# Patient Record
Sex: FEMALE | Race: WHITE | NOT HISPANIC OR LATINO | Employment: FULL TIME | RURAL
[De-identification: names, ages, dates, MRNs, and addresses within clinical notes are randomized per-mention and may not be internally consistent; named-entity substitution may affect disease eponyms.]

---

## 2017-07-27 ENCOUNTER — TRANSCRIBE ORDERS (OUTPATIENT)
Dept: PHYSICAL THERAPY | Facility: HOSPITAL | Age: 32
End: 2017-07-27

## 2017-07-27 DIAGNOSIS — S80.02XA CONTUSION OF LEFT KNEE, INITIAL ENCOUNTER: Primary | ICD-10-CM

## 2017-08-09 ENCOUNTER — APPOINTMENT (OUTPATIENT)
Dept: PHYSICAL THERAPY | Facility: HOSPITAL | Age: 32
End: 2017-08-09

## 2017-08-14 ENCOUNTER — HOSPITAL ENCOUNTER (OUTPATIENT)
Dept: PHYSICAL THERAPY | Facility: HOSPITAL | Age: 32
Setting detail: THERAPIES SERIES
Discharge: HOME | End: 2017-08-14

## 2017-08-14 DIAGNOSIS — M25.551 BILATERAL HIP PAIN: ICD-10-CM

## 2017-08-14 DIAGNOSIS — S80.02XA CONTUSION OF LEFT KNEE, INITIAL ENCOUNTER: Primary | ICD-10-CM

## 2017-08-14 DIAGNOSIS — M25.552 BILATERAL HIP PAIN: ICD-10-CM

## 2017-08-14 PROCEDURE — 97161 PT EVAL LOW COMPLEX 20 MIN: CPT
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
## 2017-08-24 ENCOUNTER — HOSPITAL ENCOUNTER (OUTPATIENT)
Dept: PHYSICAL THERAPY | Facility: HOSPITAL | Age: 32
Setting detail: THERAPIES SERIES
Discharge: HOME | End: 2017-08-24

## 2017-08-24 DIAGNOSIS — M25.552 BILATERAL HIP PAIN: ICD-10-CM

## 2017-08-24 DIAGNOSIS — S80.02XA CONTUSION OF LEFT KNEE, INITIAL ENCOUNTER: Primary | ICD-10-CM

## 2017-08-24 DIAGNOSIS — M25.551 BILATERAL HIP PAIN: ICD-10-CM

## 2017-08-24 PROCEDURE — 97110 THERAPEUTIC EXERCISES: CPT

## 2017-08-24 NOTE — THERAPY TREATMENT NOTE
Outpatient Physical Therapy Ortho Treatment Note  [facility]  Laura Bennett PTA  17  9:45 AM       Patient Name: Manish Lorenzana  : 1985  MRN: [id number]  Today's Date: 2017      Visit Date: 2017  Subjective Improvement: 0%       Attendance:    Approved: 6 visits          MD follow up: ?          RC date: 17         Visit Dx:    ICD-10-CM ICD-9-CM   1. Contusion of left knee, initial encounter S80.02XA 924.11   2. Bilateral hip pain M25.551 719.45    M25.552        There is no problem list on file for this patient.       Past Medical History:   Diagnosis Date   • ADD (attention deficit disorder)    • Disease of thyroid gland     hypothyroid        Past Surgical History:   Procedure Laterality Date   • BICEPS TENDON REPAIR Right    • EAR CANALOPLASTY     • HYSTERECTOMY      partial   • LAPAROSCOPIC TUBAL LIGATION     • ROTATOR CUFF REPAIR Right    • TONSILLECTOMY               PT Ortho       17 0800    Subjective Comments    Subjective Comments pt reports that today her L knee is bothering her more than anything else.  -LONI    Precautions and Contraindications    Precautions/Limitations no known precautions/limitations  -LONI    Subjective Pain    Able to rate subjective pain? yes  -LONI    Pre-Treatment Pain Level 6   L knee  -      User Key  (r) = Recorded By, (t) = Taken By, (c) = Cosigned By    Initials Name Provider Type    LONI Bennett PTA Physical Therapy Assistant                            PT Assessment/Plan       17 0900       PT Assessment    Functional Limitations Limitations in community activities;Limitations in functional capacity and performance;Performance in leisure activities;Performance in work activities  -     Impairments Range of motion;Posture;Poor body mechanics;Pain;Muscle strength;Joint mobility  -     Assessment Comments pt needed no cues for TA contraction today. pt did have some pain with SLR. Good quad  contraction.  -LONI     Rehab Potential Good  -LONI     Patient/caregiver participated in establishment of treatment plan and goals Yes  -LONI     Patient would benefit from skilled therapy intervention Yes  -LNOI     PT Plan    PT Frequency 2x/week  -LONI     Predicted Duration of Therapy Intervention (days/wks) 4 weeks  -LONI     PT Plan Comments Check alignment. Mini squats next  -LONI       User Key  (r) = Recorded By, (t) = Taken By, (c) = Cosigned By    Initials Name Provider Type    LONI Bennett PTA Physical Therapy Assistant                Modalities       08/24/17 0800          Subjective Pain    Post-Treatment Pain Level 0  -LONI      Ice    Ice Applied --   deferred  -LONI        User Key  (r) = Recorded By, (t) = Taken By, (c) = Cosigned By    Initials Name Provider Type    LONI Bennett PTA Physical Therapy Assistant                Exercises       08/24/17 0800          Subjective Comments    Subjective Comments pt reports that today her L knee is bothering her more than anything else.  -LONI      Subjective Pain    Able to rate subjective pain? yes  -LONI      Pre-Treatment Pain Level 6   L knee  -LONI      Post-Treatment Pain Level 0  -LONI      Aquatics    Aquatics performed? No  -LONI      Exercise 1    Exercise Name 1 PRO II for ROM  -LONI      Time (Minutes) 1 8 min  -LONI      Additional Comments L2.0  -LONI      Exercise 2    Exercise Name 2 HS S   -LONI      Reps 2 3  -LONI      Time (Seconds) 2 30 sec hold  -LONI      Exercise 3    Exercise Name 3 Incline S  -LONI      Reps 3 3  -LONI      Time (Seconds) 3 30 sec hold  -LONI      Exercise 4    Exercise Name 4 B hip flexor S  -LONI      Reps 4 3  -LONI      Time (Seconds) 4 30 sec each  -LONI      Exercise 5    Exercise Name 5 CR/TR  -LONI      Sets 5 2  -LONI      Reps 5 10  -LONI      Exercise 6    Exercise Name 6 B QS  -LONI      Sets 6 2  -LONI      Reps 6 10  -LONI      Time (Seconds) 6 5 sec hold  -LONI      Exercise 7    Exercise Name 7 B SLR FF  -LONI      Reps 7 10   each  -LONI       Exercise 8    Exercise Name 8 Bridges w/ TA  -LONI        User Key  (r) = Recorded By, (t) = Taken By, (c) = Cosigned By    Initials Name Provider Type    LONI Bennett PTA Physical Therapy Assistant                               PT OP Goals       08/24/17 0900       PT Short Term Goals    STG Date to Achieve 08/28/17  -LONI     STG 1 IND and compliant with HEP  -LONI     STG 1 Progress Ongoing  -LONI     STG 2 Reduce pain from 6/10 to no higher than 4/10  -LONI     STG 2 Progress Ongoing  -LONI     STG 3 Increase B hip flexor strength to 4-/5   -LONI     STG 3 Progress Ongoing  -LONI     STG 4 Reduce LEFS score 40/80  -LONI     STG 4 Progress Ongoing  -LONI     Long Term Goals    LTG Date to Achieve 09/11/17  -LONI     LTG 1 Patient will be able to tolerate 45 minute treatment session without pain increase over 2/10 to improve tolerance to work tasks.  -LONI     LTG 1 Progress Ongoing  -LONI     LTG 2 Patient will increase B hip flexor strength to 4+/5  -LONI     LTG 2 Progress Ongoing  -LONI     LTG 3 Patient will increase B hip abd strength to 4+/5  -LONI     LTG 3 Progress Ongoing  -LONI     LTG 4 Patient will be able to demo squat and lifting mechanics without verbal cuing.   -LONI     LTG 4 Progress Ongoing  -LONI     LTG 5 Patient will have pain free PROM in B hips  -LONI     LTG 5 Progress Ongoing  -LONI     Time Calculation    PT Goal Re-Cert Due Date 09/04/17   Visits: 2/2 MD: ? Improvement: ?  -LONI       User Key  (r) = Recorded By, (t) = Taken By, (c) = Cosigned By    Initials Name Provider Type    LONI Bennett PTA Physical Therapy Assistant                Therapy Education       08/24/17 0900          Therapy Education    Education Details SLR FF, QS, Oren w/ TA  -LONI      Given HEP;Pain management;Posture/body mechanics  -LONI      Program New  -LONI      How Provided Written;Verbal  -LONI      Provided to Patient  -LONI      Level of Understanding Verbalized;Teach back education performed  -LONI        User Key  (r) = Recorded By,  (t) = Taken By, (c) = Cosigned By    Initials Name Provider Type    LONI Bennett PTA Physical Therapy Assistant                Time Calculation:   Start Time: 0845  Stop Time: 0931  Time Calculation (min): 46 min  Total Timed Code Minutes- PT: 46 minute(s)    Therapy Charges for Today     Code Description Service Date Service Provider Modifiers Qty    74068002156 HC PT THER PROC EA 15 MIN 8/24/2017 Laura Bennett PTA GP 3                    Laura Bennett PTA  8/24/2017

## 2017-08-28 ENCOUNTER — APPOINTMENT (OUTPATIENT)
Dept: PHYSICAL THERAPY | Facility: HOSPITAL | Age: 32
End: 2017-08-28

## 2017-08-30 ENCOUNTER — HOSPITAL ENCOUNTER (OUTPATIENT)
Dept: PHYSICAL THERAPY | Facility: HOSPITAL | Age: 32
Setting detail: THERAPIES SERIES
Discharge: HOME | End: 2017-08-30

## 2017-08-30 DIAGNOSIS — M25.551 BILATERAL HIP PAIN: ICD-10-CM

## 2017-08-30 DIAGNOSIS — S80.02XA CONTUSION OF LEFT KNEE, INITIAL ENCOUNTER: Primary | ICD-10-CM

## 2017-08-30 DIAGNOSIS — M25.552 BILATERAL HIP PAIN: ICD-10-CM

## 2017-08-30 PROCEDURE — 97110 THERAPEUTIC EXERCISES: CPT | Performed by: PHYSICAL THERAPIST

## 2017-09-06 ENCOUNTER — APPOINTMENT (OUTPATIENT)
Dept: PHYSICAL THERAPY | Facility: HOSPITAL | Age: 32
End: 2017-09-06

## 2017-09-08 ENCOUNTER — HOSPITAL ENCOUNTER (OUTPATIENT)
Dept: PHYSICAL THERAPY | Facility: HOSPITAL | Age: 32
Setting detail: THERAPIES SERIES
Discharge: HOME | End: 2017-09-08

## 2017-09-08 DIAGNOSIS — M25.552 BILATERAL HIP PAIN: ICD-10-CM

## 2017-09-08 DIAGNOSIS — M25.551 BILATERAL HIP PAIN: ICD-10-CM

## 2017-09-08 DIAGNOSIS — S80.02XA CONTUSION OF LEFT KNEE, INITIAL ENCOUNTER: Primary | ICD-10-CM

## 2017-09-08 PROCEDURE — 97110 THERAPEUTIC EXERCISES: CPT

## 2017-09-08 NOTE — THERAPY TREATMENT NOTE
Outpatient Physical Therapy Ortho Treatment Note  [facility]  Laura Bennett, PTA  17  11:41 AM       Patient Name: Manish Lorenzana  : 1985  MRN: [id number]  Today's Date: 2017      Visit Date: 2017    Subjective Improvement: ?       Attendance:   Approved: 6           MD follow up: ?          RC date: 17         Visit Dx:    ICD-10-CM ICD-9-CM   1. Contusion of left knee, initial encounter S80.02XA 924.11   2. Bilateral hip pain M25.551 719.45    M25.552        There is no problem list on file for this patient.       Past Medical History:   Diagnosis Date   • ADD (attention deficit disorder)    • Disease of thyroid gland     hypothyroid        Past Surgical History:   Procedure Laterality Date   • BICEPS TENDON REPAIR Right    • EAR CANALOPLASTY     • HYSTERECTOMY      partial   • LAPAROSCOPIC TUBAL LIGATION     • ROTATOR CUFF REPAIR Right    • TONSILLECTOMY                               PT Assessment/Plan       17 1100       PT Assessment    Functional Limitations Limitations in community activities;Limitations in functional capacity and performance;Performance in leisure activities;Performance in work activities  -     Impairments Range of motion;Posture;Poor body mechanics;Pain;Muscle strength;Joint mobility  -LONI     Assessment Comments pt did well with RX today- pt had no verbal complaints of pain throughout and had decrease pain overall at end of RX. Added to HEP and pt verbalized understanding  -LONI     Rehab Potential Good  -LONI     Patient/caregiver participated in establishment of treatment plan and goals Yes  -LONI     Patient would benefit from skilled therapy intervention Yes  -LONI     PT Plan    PT Frequency 2x/week  -LONI     Predicted Duration of Therapy Intervention (days/wks) 4 weeks  -LONI     PT Plan Comments Airex activities next  -LONI       User Key  (r) = Recorded By, (t) = Taken By, (c) = Cosigned By    Initials Name Provider Type    LONI  Laura Bennett PTA Physical Therapy Assistant                    Exercises       09/08/17 1100          Subjective Comments    Subjective Comments  States that her left knee is still the one that gives her the most trouble and is very frustrated with it.  -LONI      Subjective Pain    Able to rate subjective pain? yes  -LONI      Pre-Treatment Pain Level 3  -OLNI      Post-Treatment Pain Level 0  -LONI      Aquatics    Aquatics performed? No  -LONI      Exercise 1    Exercise Name 1 HS S  -LONI      Reps 1 3  -LONI      Time (Seconds) 1 30 sec hold  -LONI      Exercise 2    Exercise Name 2 Incline S  -LONI      Reps 2 3  -LONI      Time (Seconds) 2 30 sec hold  -LONI      Exercise 3    Exercise Name 3 B hip flexor S  -LONI      Reps 3 2  -LONI      Time (Seconds) 3 30 sec hold  -LONI      Exercise 4    Exercise Name 4 CR/TR  -LONI      Sets 4 2  -LONI      Reps 4 10  -LONI      Exercise 5    Exercise Name 5 Mini squats  -LONI      Sets 5 2  -LONI      Reps 5 10  -LONI      Exercise 6    Exercise Name 6 ST hip abd/ext  -LONI      Sets 6 2  -LONI      Reps 6 10  -LONI      Exercise 7    Exercise Name 7 B step ups fwd  -LONI      Sets 7 2  -LONI      Reps 7 10  -LONI      Additional Comments 6 inch  -LONI      Exercise 8    Exercise Name 8 ST TKE w/ tband  -LONI      Sets 8 2  -LONI      Reps 8 10  -LONI      Additional Comments red  -LONI      Exercise 9    Exercise Name 9 B SLR FF  -LONI      Sets 9 2  -LONI      Reps 9 10  -LONI      Exercise 10    Exercise Name 10 B clamshells   -LONI      Sets 10 2  -LONI      Reps 10 10  -LONI      Additional Comments red  -LONI        User Key  (r) = Recorded By, (t) = Taken By, (c) = Cosigned By    Initials Name Provider Type    LONI Bennett PTA Physical Therapy Assistant                               PT OP Goals       09/08/17 1100       PT Short Term Goals    STG Date to Achieve 08/28/17  -LONI     STG 1 IND and compliant with HEP  -LONI     STG 1 Progress Ongoing  -LONI     STG 2 Reduce pain from 6/10 to no higher than 4/10  -LONI     STG 2  Progress Ongoing  -LONI     STG 3 Increase B hip flexor strength to 4-/5   -LONI     STG 3 Progress Ongoing  -LONI     STG 4 Reduce LEFS score 40/80  -LONI     STG 4 Progress Ongoing  -LONI     Long Term Goals    LTG Date to Achieve 09/11/17  -LONI     LTG 1 Patient will be able to tolerate 45 minute treatment session without pain increase over 2/10 to improve tolerance to work tasks.  -LONI     LTG 1 Progress Ongoing  -LONI     LTG 2 Patient will increase B hip flexor strength to 4+/5  -LONI     LTG 2 Progress Ongoing  -LONI     LTG 3 Patient will increase B hip abd strength to 4+/5  -LONI     LTG 3 Progress Ongoing  -LONI     LTG 4 Patient will be able to demo squat and lifting mechanics without verbal cuing.   -LONI     LTG 4 Progress Ongoing  -LONI     LTG 5 Patient will have pain free PROM in B hips  -LONI     LTG 5 Progress Ongoing  -LONI     Time Calculation    PT Goal Re-Cert Due Date 09/04/17   Visits: 4/4 MD: ?  -LONI       User Key  (r) = Recorded By, (t) = Taken By, (c) = Cosigned By    Initials Name Provider Type    LONI Bennett PTA Physical Therapy Assistant                Therapy Education       09/08/17 1100          Therapy Education    Education Details Red tband to ceferino  -LONI      Given HEP;Symptoms/condition management;Pain management;Posture/body mechanics  -LONI      Program Reinforced  -LONI      How Provided Verbal  -LONI      Provided to Patient  -LONI      Level of Understanding Verbalized;Demonstrated  -LONI        User Key  (r) = Recorded By, (t) = Taken By, (c) = Cosigned By    Initials Name Provider Type    LONI Bennett PTA Physical Therapy Assistant                Time Calculation:   Start Time: 1021  Stop Time: 1100  Time Calculation (min): 39 min  Total Timed Code Minutes- PT: 39 minute(s)    Therapy Charges for Today     Code Description Service Date Service Provider Modifiers Qty    82380578415 HC PT THER PROC EA 15 MIN 9/8/2017 Laura Bennett PTA GP 3                    Laura Bennett  PTA  9/8/2017

## 2017-09-11 ENCOUNTER — APPOINTMENT (OUTPATIENT)
Dept: PHYSICAL THERAPY | Facility: HOSPITAL | Age: 32
End: 2017-09-11

## 2017-09-13 ENCOUNTER — HOSPITAL ENCOUNTER (OUTPATIENT)
Dept: PHYSICAL THERAPY | Facility: HOSPITAL | Age: 32
Setting detail: THERAPIES SERIES
Discharge: HOME | End: 2017-09-13

## 2017-09-13 DIAGNOSIS — M25.552 BILATERAL HIP PAIN: ICD-10-CM

## 2017-09-13 DIAGNOSIS — S80.02XA CONTUSION OF LEFT KNEE, INITIAL ENCOUNTER: Primary | ICD-10-CM

## 2017-09-13 DIAGNOSIS — M25.551 BILATERAL HIP PAIN: ICD-10-CM

## 2017-09-13 PROCEDURE — 97110 THERAPEUTIC EXERCISES: CPT

## 2017-09-13 NOTE — THERAPY TREATMENT NOTE
Outpatient Physical Therapy Ortho Treatment Note  [facility]  Laura Bennett PTA  17  2:49 PM       Patient Name: Manish Lorenzana  : 1985  MRN: [id number]  Today's Date: 2017      Visit Date: 2017    Subjective Improvement: pain is better       Attendance:   Approved: 6           MD follow up: PRN           RC date: 17        Visit Dx:    ICD-10-CM ICD-9-CM   1. Contusion of left knee, initial encounter S80.02XA 924.11   2. Bilateral hip pain M25.551 719.45    M25.552        There is no problem list on file for this patient.       Past Medical History:   Diagnosis Date   • ADD (attention deficit disorder)    • Disease of thyroid gland     hypothyroid        Past Surgical History:   Procedure Laterality Date   • BICEPS TENDON REPAIR Right    • EAR CANALOPLASTY     • HYSTERECTOMY      partial   • LAPAROSCOPIC TUBAL LIGATION     • ROTATOR CUFF REPAIR Right    • TONSILLECTOMY               PT Ortho       17 1400    Subjective Comments    Subjective Comments The pain is aggravating today  -    Precautions and Contraindications    Precautions/Limitations no known precautions/limitations  -    Subjective Pain    Post-Treatment Pain Level 0  -      User Key  (r) = Recorded By, (t) = Taken By, (c) = Cosigned By    Initials Name Provider Type    LONI Laura Bennett PTA Physical Therapy Assistant                            PT Assessment/Plan       17 1400       PT Assessment    Functional Limitations Limitations in community activities;Limitations in functional capacity and performance;Performance in leisure activities;Performance in work activities  -     Impairments Range of motion;Posture;Poor body mechanics;Pain;Muscle strength;Joint mobility  -     Assessment Comments pt did well with new therex added. pt continues to come in with minimal pain and end with no pain. Pt still fatigues some with resisted hip exercises but does well overall  -      Rehab Potential Good  -LONI     Patient/caregiver participated in establishment of treatment plan and goals Yes  -LONI     Patient would benefit from skilled therapy intervention Yes  -LONI     PT Plan    PT Frequency 2x/week  -LONI     Predicted Duration of Therapy Intervention (days/wks) 4 weeks  -LONI     PT Plan Comments Recip stairs. Add tband 4 ST 4 way SLR next visit  -LONI       User Key  (r) = Recorded By, (t) = Taken By, (c) = Cosigned By    Initials Name Provider Type    LONI Bennett PTA Physical Therapy Assistant                    Exercises       09/13/17 1400          Subjective Comments    Subjective Comments The pain is aggravating today  -LONI      Subjective Pain    Able to rate subjective pain? yes  -LONI      Pre-Treatment Pain Level 3  -LONI      Post-Treatment Pain Level 0  -LONI      Aquatics    Aquatics performed? No  -LONI      Exercise 1    Exercise Name 1 PRO II for ROM  -LONI      Time (Minutes) 1 6 min  -LONI      Exercise 2    Exercise Name 2 Incline S  -LONI      Reps 2 3  -LONI      Time (Seconds) 2 30 sec hold  -LONI      Exercise 3    Exercise Name 3 HS S  -LONI      Reps 3 2  -LONI      Time (Seconds) 3 30 sec hold  -LONI      Exercise 4    Exercise Name 4 Hip flexor S  -LONI      Reps 4 2  -LONI      Time (Seconds) 4 30 sec hold  -LONI      Exercise 5    Exercise Name 5 Airex: CR/TR  -LONI      Sets 5 2  -LONI      Reps 5 10  -LONI      Exercise 6    Exercise Name 6 Airex: mini squats  -LONI      Sets 6 2  -LONI      Reps 6 10  -LONI      Exercise 7    Exercise Name 7 Airex: march  -LONI      Sets 7 2  -LONI      Reps 7 10  -LONI      Exercise 8    Exercise Name 8 BOSU step ups fwd/lat  -LONI      Sets 8 2  -LONI      Reps 8 10  -LONI      Additional Comments --  -LONI      Exercise 9    Exercise Name 9 CC: TKE  -LONI      Sets 9 2  -LONI      Reps 9 10  -LONI      Additional Comments 3 plates  -LONI      Exercise 10    Exercise Name 10 ST 4 way B SLR w/ tband  -LONI      Reps 10 10   each direction  -LONI      Additional Comments red  -LONI       "Exercise 11    Exercise Name 11 B ceferino w/ tband  -LONI      Sets 11 2  -LONI      Reps 11 10  -LONI      Additional Comments red  -LONI        User Key  (r) = Recorded By, (t) = Taken By, (c) = Cosigned By    Initials Name Provider Type    LONI Bennett PTA Physical Therapy Assistant                               PT OP Goals       09/13/17 1400       PT Short Term Goals    STG Date to Achieve 08/28/17  -LONI     STG 1 IND and compliant with HEP  -LONI     STG 1 Progress Ongoing  -LONI     STG 2 Reduce pain from 6/10 to no higher than 4/10  -LONI     STG 2 Progress Ongoing  -LONI     STG 3 Increase B hip flexor strength to 4-/5   -LONI     STG 3 Progress Ongoing  -LONI     STG 4 Reduce LEFS score 40/80  -LONI     STG 4 Progress Ongoing  -LONI     Long Term Goals    LTG Date to Achieve 09/11/17  -LONI     LTG 1 Patient will be able to tolerate 45 minute treatment session without pain increase over 2/10 to improve tolerance to work tasks.  -LONI     LTG 1 Progress Ongoing  -LONI     LTG 2 Patient will increase B hip flexor strength to 4+/5  -LONI     LTG 2 Progress Ongoing  -LONI     LTG 3 Patient will increase B hip abd strength to 4+/5  -LONI     LTG 3 Progress Ongoing  -LONI     LTG 4 Patient will be able to demo squat and lifting mechanics without verbal cuing.   -LONI     LTG 4 Progress Ongoing  -LONI     LTG 5 Patient will have pain free PROM in B hips  -LONI     LTG 5 Progress Ongoing  -LONI     Time Calculation    PT Goal Re-Cert Due Date 09/04/17   Visits: 5/5 MD: PRN Improvement: \"pain is better\"   -LONI       User Key  (r) = Recorded By, (t) = Taken By, (c) = Cosigned By    Initials Name Provider Type    LONI Bennett PTA Physical Therapy Assistant                Therapy Education       09/13/17 1400          Therapy Education    Given HEP;Symptoms/condition management;Pain management;Posture/body mechanics  -LONI      Program Reinforced  -LONI      How Provided Verbal  -LONI      Provided to Patient  -LONI      Level of Understanding " Verbalized;Demonstrated  -LONI        User Key  (r) = Recorded By, (t) = Taken By, (c) = Cosigned By    Initials Name Provider Type    LONI Bennett, GAYATHRI Physical Therapy Assistant                Time Calculation:   Start Time: 1351  Stop Time: 1433  Time Calculation (min): 42 min  Total Timed Code Minutes- PT: 42 minute(s)    Therapy Charges for Today     Code Description Service Date Service Provider Modifiers Qty    56568112527 HC PT THER PROC EA 15 MIN 9/13/2017 Laura Bennett PTA GP 3                    Laura Bennett PTA  9/13/2017

## 2017-09-15 ENCOUNTER — APPOINTMENT (OUTPATIENT)
Dept: PHYSICAL THERAPY | Facility: HOSPITAL | Age: 32
End: 2017-09-15

## 2017-09-22 ENCOUNTER — HOSPITAL ENCOUNTER (OUTPATIENT)
Dept: PHYSICAL THERAPY | Facility: HOSPITAL | Age: 32
Setting detail: THERAPIES SERIES
Discharge: HOME | End: 2017-09-22

## 2017-09-22 DIAGNOSIS — S80.02XA CONTUSION OF LEFT KNEE, INITIAL ENCOUNTER: Primary | ICD-10-CM

## 2017-09-22 DIAGNOSIS — M25.552 BILATERAL HIP PAIN: ICD-10-CM

## 2017-09-22 DIAGNOSIS — M25.551 BILATERAL HIP PAIN: ICD-10-CM

## 2017-09-22 PROCEDURE — 97110 THERAPEUTIC EXERCISES: CPT | Performed by: PHYSICAL THERAPIST

## 2017-09-22 NOTE — THERAPY RE-EVALUATION
Outpatient Physical Therapy Ortho Progress Note  [facility]     Patient Name: Manish Lorenzana  : 1985  MRN: [id number]  Today's Date: 2017      Visit Date: 2017     Subjective Improvement:  60-65%     Attendance:   Approved:   6 visits; requesting additional        MD follow up: PRN           RC date:     10/13/17      There is no problem list on file for this patient.       Past Medical History:   Diagnosis Date   • ADD (attention deficit disorder)    • Disease of thyroid gland     hypothyroid        Past Surgical History:   Procedure Laterality Date   • BICEPS TENDON REPAIR Right    • EAR CANALOPLASTY     • HYSTERECTOMY      partial   • LAPAROSCOPIC TUBAL LIGATION     • ROTATOR CUFF REPAIR Right    • TONSILLECTOMY         Visit Dx:     ICD-10-CM ICD-9-CM   1. Contusion of left knee, initial encounter S80.02XA 924.11   2. Bilateral hip pain M25.551 719.45    M25.552                  PT Ortho       17 0900    Precautions and Contraindications    Precautions/Limitations no known precautions/limitations  -KG    Posture/Observations    Posture/Observations Comments No signs of acute distress. No mal-alignment noted at pelvis this date. Reports TTP at lateral hip/hip flexors & medial knee  -KG    ROM (Range of Motion)    General ROM Detail Bilateral hip/knee AROM WNL in all planes. R hip PROM WNL with no pain reported. L hip PROM WNL with slight pull reported inside of knee with ER  -KG    Left Hip    Hip Flexion Gross Movement (4/5) good  -KG    Hip Extension Gross Movement (4+/5) good plus  -KG    Hip ABduction Gross Movement (4+/5) good plus  -KG    Right Hip    Hip Flexion Gross Movement (4+/5) good plus  -KG    Hip Extension Gross Movement (4+/5) good plus  -KG    Hip ABduction Gross Movement (4+/5) good plus  -KG    Left Knee    Knee Extension Gross Movement (4+/5) good plus  -KG    Knee Flexion Gross Movement (4+/5) good plus  -KG    Right Knee    Knee Extension Gross  Movement (4+/5) good plus  -KG    Knee Flexion Gross Movement (4+/5) good plus  -KG    Lower Extremity Flexibility    Hamstrings Bilateral:;Mildly limited  -KG    Hip Flexors Bilateral:;Moderately limited  -KG    Quadriceps Bilateral:;Mildly limited  -KG      User Key  (r) = Recorded By, (t) = Taken By, (c) = Cosigned By    Initials Name Provider Type    KG Jing Welch, PT Physical Therapist                            Therapy Education       09/22/17 0900          Therapy Education    Education Details HEP: add standing 4-way hip SLR with tband  -KG      Given HEP;Symptoms/condition management  -KG      Program Progressed  -KG      How Provided Verbal;Demonstration  -KG      Provided to Patient  -KG      Level of Understanding Teach back education performed;Verbalized;Demonstrated  -KG        User Key  (r) = Recorded By, (t) = Taken By, (c) = Cosigned By    Initials Name Provider Type    KG Jing Welch, PT Physical Therapist                PT OP Goals       09/22/17 0900       PT Short Term Goals    STG Date to Achieve 08/28/17  -KG     STG 1 IND and compliant with HEP  -KG     STG 1 Progress Ongoing  -KG     STG 2 Reduce pain from 6/10 to no higher than 4/10  -KG     STG 2 Progress Met  -KG     STG 3 Increase B hip flexor strength to 4-/5   -KG     STG 3 Progress Met  -KG     STG 4 Reduce LEFS score 40/80  -KG     STG 4 Progress Met  -KG     STG 4 Progress Comments Increased to 72/80, which shows functional improvement  -KG     Long Term Goals    LTG Date to Achieve 09/11/17  -KG     LTG 1 Patient will be able to tolerate 45 minute treatment session without pain increase over 2/10 to improve tolerance to work tasks.  -KG     LTG 1 Progress Ongoing  -KG     LTG 2 Patient will increase B hip flexor strength to 4+/5  -KG     LTG 2 Progress Ongoing  -KG     LTG 3 Patient will increase B hip abd strength to 4+/5  -KG     LTG 3 Progress Met  -KG     LTG 4 Patient will be able to demo squat and lifting mechanics  without verbal cuing.   -KG     LTG 4 Progress Ongoing  -KG     LTG 5 Patient will have pain free PROM in B hips  -KG     LTG 5 Progress Partially Met;Ongoing  -KG     LTG 5 Progress Comments Slight pain/discomfort with L hip ER  -KG     Time Calculation    PT Goal Re-Cert Due Date 10/13/17   Visit 6/9, 60-65% improvement  -KG       User Key  (r) = Recorded By, (t) = Taken By, (c) = Cosigned By    Initials Name Provider Type    DANITZA Welch PT Physical Therapist                PT Assessment/Plan       09/22/17 0900       PT Assessment    Functional Limitations Limitations in community activities;Limitations in functional capacity and performance;Performance in leisure activities;Performance in work activities  -KG     Impairments Range of motion;Posture;Poor body mechanics;Pain;Muscle strength;Joint mobility  -KG     Assessment Comments Pt has progressed well with therapy at this time. Demonstrates good improvements in bilateral hip flex/abd strength. No malalignment noted this date at pelvis.  Bilateral hip PROM is WNL with slight discomfort/pain reported only with L ER. Pt continues to demonstrate weakness & decreased muscle endurance in hips & will benefit from further skilled PT to improve. Reports 60-65% overall subjective improvement at this time.  -KG     Rehab Potential Good  -KG     Patient/caregiver participated in establishment of treatment plan and goals Yes  -KG     Patient would benefit from skilled therapy intervention Yes  -KG     PT Plan    PT Frequency 2x/week  -KG     Predicted Duration of Therapy Intervention (days/wks) 2-3 more weeks  -KG     PT Plan Comments Continue to progress hip strengthening. Monitor alignment.  -KG       User Key  (r) = Recorded By, (t) = Taken By, (c) = Cosigned By    Initials Name Provider Type    DANITZA Welch PT Physical Therapist                  Exercises       09/22/17 0900          Subjective Comments    Subjective Comments Pt states overall she's  "doing well with therapy. Pt states she is still having pain every now & then, but it isn't as intense and happens less frequently. States her R hip shifts a little still but it's not near as bad. States her L knee & L hip are a little \"irritated\" today but states it's not bad. States she is walking over 10,000 steps per day while at work.    50%  -KG      Subjective Pain    Able to rate subjective pain? yes  -KG      Pre-Treatment Pain Level 4  -KG      Post-Treatment Pain Level 0  -KG      Aquatics    Aquatics performed? No  -KG      Exercise 1    Exercise Name 1 Incline S  -KG      Reps 1 3  -KG      Time (Seconds) 1 30  -KG      Exercise 2    Exercise Name 2 Hamstring Stretch  -KG      Reps 2 3  -KG      Time (Seconds) 2 30  -KG      Exercise 3    Exercise Name 3 Airex Mini Squats  -KG      Sets 3 2  -KG      Reps 3 10  -KG      Exercise 4    Exercise Name 4 BOSU Fwd step ups  -KG      Sets 4 2  -KG      Reps 4 10  -KG      Additional Comments Bilateral  -KG      Exercise 5    Exercise Name 5 BOSU lateral step up/over  -KG      Sets 5 2  -KG      Reps 5 10  -KG      Exercise 6    Exercise Name 6 ST 4 way B SLR w/ tband  -KG      Sets 6 2  -KG      Reps 6 10  -KG      Additional Comments Red  -KG      Exercise 7    Exercise Name 7 SL Hip Series flex/abd/ext  -KG      Sets 7 1  -KG      Reps 7 10  -KG      Additional Comments Bilateral  -KG      Exercise 8    Exercise Name 8 B clamshells w/ tband  -KG      Sets 8 2  -KG      Reps 8 10  -KG      Additional Comments Red  -KG        User Key  (r) = Recorded By, (t) = Taken By, (c) = Cosigned By    Initials Name Provider Type    KG Jing Welch PT Physical Therapist                              Outcome Measures       09/22/17 0900          Lower Extremity Functional Index    Any of your usual work, housework or school activities 3  -KG      Your usual hobbies, recreational or sporting activities 3  -KG      Getting into or out of the bath 4  -KG      Walking " between rooms 4  -KG      Putting on your shoes or socks 4  -KG      Squatting 3  -KG      Lifting an object, like a bag of groceries from the floor 4  -KG      Performing light activities around your home 4  -KG      Performing heavy activities around your home 3  -KG      Getting into or out of a car 4  -KG      Walking 2 blocks 4  -KG      Walking a mile 4  -KG      Going up or down 10 stairs (about 1 flight of stairs) 4  -KG      Standing for 1 hour 4  -KG      Sitting for 1 hour 3  -KG      Running on even ground 4  -KG      Running on uneven ground 3  -KG      Making sharp turns while running fast 3  -KG      Hopping 3  -KG      Rolling over in bed 4  -KG      Total 72  -KG      Functional Assessment    Outcome Measure Options Lower Extremity Functional Scale (LEFS)  -KG        User Key  (r) = Recorded By, (t) = Taken By, (c) = Cosigned By    Initials Name Provider Type    KG Jing Welch, PT Physical Therapist            Time Calculation:   Start Time: 0936  Stop Time: 1015  Time Calculation (min): 39 min  Total Timed Code Minutes- PT: 39 minute(s)     Therapy Charges for Today     Code Description Service Date Service Provider Modifiers Qty    80571124519 HC PT THER PROC EA 15 MIN 9/22/2017 Jing Welch, PT GP 3          PT G-Codes  Outcome Measure Options: Lower Extremity Functional Scale (LEFS)         Jing Welch, PT  9/22/2017

## 2017-10-02 ENCOUNTER — TELEPHONE (OUTPATIENT)
Dept: PHYSICAL THERAPY | Facility: HOSPITAL | Age: 32
End: 2017-10-02

## 2017-10-04 ENCOUNTER — APPOINTMENT (OUTPATIENT)
Dept: PHYSICAL THERAPY | Facility: HOSPITAL | Age: 32
End: 2017-10-04

## 2017-10-06 ENCOUNTER — APPOINTMENT (OUTPATIENT)
Dept: PHYSICAL THERAPY | Facility: HOSPITAL | Age: 32
End: 2017-10-06

## 2017-10-09 ENCOUNTER — HOSPITAL ENCOUNTER (OUTPATIENT)
Dept: PHYSICAL THERAPY | Facility: HOSPITAL | Age: 32
Setting detail: THERAPIES SERIES
End: 2017-10-09

## 2017-11-08 ENCOUNTER — DOCUMENTATION (OUTPATIENT)
Dept: PHYSICAL THERAPY | Facility: HOSPITAL | Age: 32
End: 2017-11-08

## 2017-11-08 DIAGNOSIS — S80.02XA CONTUSION OF LEFT KNEE, INITIAL ENCOUNTER: Primary | ICD-10-CM

## 2017-11-08 DIAGNOSIS — M25.551 BILATERAL HIP PAIN: ICD-10-CM

## 2017-11-08 DIAGNOSIS — M25.552 BILATERAL HIP PAIN: ICD-10-CM

## 2017-11-08 NOTE — THERAPY DISCHARGE NOTE
Outpatient Physical Therapy Discharge Summary         Patient Name: Manish Lorenzana  : 1985  MRN: [id number]    Today's Date: 2017    Visit Dx:    ICD-10-CM ICD-9-CM   1. Contusion of left knee, initial encounter S80.02XA 924.11   2. Bilateral hip pain M25.551 719.45    M25.552              PT OP Goals       17 0800       PT Short Term Goals    STG Date to Achieve 17  -KG     STG 1 IND and compliant with HEP  -KG     STG 1 Progress Not Met  -KG     STG 2 Reduce pain from 6/10 to no higher than 4/10  -KG     STG 2 Progress Met  -KG     STG 3 Increase B hip flexor strength to 4-/5   -KG     STG 3 Progress Met  -KG     STG 4 Reduce LEFS score 40/80  -KG     STG 4 Progress Met  -KG     Long Term Goals    LTG Date to Achieve 17  -KG     LTG 1 Patient will be able to tolerate 45 minute treatment session without pain increase over 2/10 to improve tolerance to work tasks.  -KG     LTG 1 Progress Not Met  -KG     LTG 2 Patient will increase B hip flexor strength to 4+/5  -KG     LTG 2 Progress Not Met  -KG     LTG 3 Patient will increase B hip abd strength to 4+/5  -KG     LTG 3 Progress Met  -KG     LTG 4 Patient will be able to demo squat and lifting mechanics without verbal cuing.   -KG     LTG 4 Progress Not Met  -KG     LTG 5 Patient will have pain free PROM in B hips  -KG     LTG 5 Progress Partially Met  -KG       User Key  (r) = Recorded By, (t) = Taken By, (c) = Cosigned By    Initials Name Provider Type    KG Jing Welch, PT Physical Therapist          OP PT Discharge Summary  Date of Discharge: 10/11/17  Reason for Discharge: Non-compliant, other (comment) (Pt did not return to therapy after visit on 17)  Outcomes Achieved: Patient able to partially acheive established goals, Refer to plan of care for updates on goals achieved  Discharge Destination: Home with home program      Time Calculation:                    Jing Welch, PT  2017

## 2018-07-23 ENCOUNTER — HOSPITAL ENCOUNTER (EMERGENCY)
Age: 33
Discharge: HOME | End: 2018-07-23
Payer: COMMERCIAL

## 2018-07-23 ENCOUNTER — APPOINTMENT (OUTPATIENT)
Dept: GENERAL RADIOLOGY | Age: 33
End: 2018-07-23
Payer: COMMERCIAL

## 2018-07-23 VITALS
HEIGHT: 61 IN | BODY MASS INDEX: 21.9 KG/M2 | SYSTOLIC BLOOD PRESSURE: 119 MMHG | DIASTOLIC BLOOD PRESSURE: 80 MMHG | RESPIRATION RATE: 16 BRPM | HEART RATE: 80 BPM | WEIGHT: 116 LBS | OXYGEN SATURATION: 98 % | TEMPERATURE: 98.1 F

## 2018-07-23 DIAGNOSIS — M25.511 ACUTE PAIN OF RIGHT SHOULDER: Primary | ICD-10-CM

## 2018-07-23 PROCEDURE — 99283 EMERGENCY DEPT VISIT LOW MDM: CPT

## 2018-07-23 PROCEDURE — 73030 X-RAY EXAM OF SHOULDER: CPT

## 2018-07-23 PROCEDURE — 99283 EMERGENCY DEPT VISIT LOW MDM: CPT | Performed by: PHYSICIAN ASSISTANT

## 2018-07-23 PROCEDURE — 96372 THER/PROPH/DIAG INJ SC/IM: CPT

## 2018-07-23 PROCEDURE — 6360000002 HC RX W HCPCS: Performed by: PHYSICIAN ASSISTANT

## 2018-07-23 RX ORDER — NAPROXEN 500 MG/1
500 TABLET ORAL 2 TIMES DAILY
Qty: 20 TABLET | Refills: 0 | Status: SHIPPED | OUTPATIENT
Start: 2018-07-23

## 2018-07-23 RX ORDER — DEXTROAMPHETAMINE SACCHARATE, AMPHETAMINE ASPARTATE, DEXTROAMPHETAMINE SULFATE AND AMPHETAMINE SULFATE 7.5; 7.5; 7.5; 7.5 MG/1; MG/1; MG/1; MG/1
30 TABLET ORAL DAILY
COMMUNITY

## 2018-07-23 RX ORDER — CYCLOBENZAPRINE HCL 10 MG
10 TABLET ORAL 3 TIMES DAILY PRN
Qty: 15 TABLET | Refills: 0 | Status: SHIPPED | OUTPATIENT
Start: 2018-07-23 | End: 2018-08-02

## 2018-07-23 RX ORDER — KETOROLAC TROMETHAMINE 30 MG/ML
30 INJECTION, SOLUTION INTRAMUSCULAR; INTRAVENOUS ONCE
Status: COMPLETED | OUTPATIENT
Start: 2018-07-23 | End: 2018-07-23

## 2018-07-23 RX ADMIN — KETOROLAC TROMETHAMINE 30 MG: 30 INJECTION, SOLUTION INTRAMUSCULAR at 07:41

## 2018-07-23 ASSESSMENT — ENCOUNTER SYMPTOMS
ABDOMINAL PAIN: 0
COUGH: 0
CHEST TIGHTNESS: 0
DIARRHEA: 0
VOMITING: 0
EYE PAIN: 0
NAUSEA: 0
SINUS PRESSURE: 0
RHINORRHEA: 0
CONSTIPATION: 0
WHEEZING: 0
SORE THROAT: 0
SHORTNESS OF BREATH: 0
APNEA: 0
ABDOMINAL DISTENTION: 0

## 2018-07-23 ASSESSMENT — PAIN DESCRIPTION - ORIENTATION: ORIENTATION: RIGHT

## 2018-07-23 ASSESSMENT — PAIN DESCRIPTION - LOCATION: LOCATION: SHOULDER

## 2018-07-23 ASSESSMENT — PAIN SCALES - GENERAL
PAINLEVEL_OUTOF10: 10
PAINLEVEL_OUTOF10: 10

## 2018-07-23 NOTE — ED PROVIDER NOTES
eMERGENCY dEPARTMENT eNCOUnter      Pt Name: Kate Samaniego  MRN: [id number]  [place] 1985  Date of evaluation: 7/23/2018  Provider: Marin Robertson, [address] [place] [address]       Chief Complaint   Patient presents with    Shoulder Pain     right         HISTORY OF PRESENT ILLNESS  (Location/Symptom, Timing/Onset, Context/Setting, Quality, Duration, Modifying Factors, Severity.)   Kate Samaniego is a 27 y.o. female who presents to the emergency department With right shoulder pain. Patient states she's been going to her chiropractor lately for pain in her shoulder, her chiropractor thought she had a pinched nerve in her neck and gave her an adjustment. She continues to have shoulder pain. Denies any kind of trauma or injury. No neck pain today or headache. Nursing Notes were reviewed and I agree. REVIEW OF SYSTEMS    (2-9 systems for level 4, 10 or more for level 5)     Review of Systems   Constitutional: Negative for activity change, chills, fatigue and fever. HENT: Negative for ear pain, hearing loss, postnasal drip, rhinorrhea, sinus pressure and sore throat. Eyes: Negative for pain and visual disturbance. Respiratory: Negative for apnea, cough, chest tightness, shortness of breath and wheezing. Cardiovascular: Negative for chest pain. Gastrointestinal: Negative for abdominal distention, abdominal pain, constipation, diarrhea, nausea and vomiting. Genitourinary: Negative for difficulty urinating, dysuria, flank pain and hematuria. Musculoskeletal: Positive for arthralgias. Negative for joint swelling. Skin: Negative for rash. Neurological: Negative for dizziness, syncope, light-headedness and headaches. Psychiatric/Behavioral: Negative for agitation and confusion. Except as noted above the remainder of the review of systems was reviewed and negative.        PAST MEDICAL HISTORY     Past Medical History:   Diagnosis Date    ADD (attention deficit disorder)

## 2020-09-14 ENCOUNTER — HOSPITAL ENCOUNTER (EMERGENCY)
Facility: HOSPITAL | Age: 35
Discharge: HOME | End: 2020-09-14

## 2020-09-14 VITALS
SYSTOLIC BLOOD PRESSURE: 133 MMHG | RESPIRATION RATE: 16 BRPM | DIASTOLIC BLOOD PRESSURE: 82 MMHG | OXYGEN SATURATION: 99 % | HEIGHT: 61 IN | WEIGHT: 120 LBS | BODY MASS INDEX: 22.66 KG/M2 | HEART RATE: 93 BPM | TEMPERATURE: 98.3 F

## 2020-09-14 PROCEDURE — 99211 OFF/OP EST MAY X REQ PHY/QHP: CPT

## 2021-04-09 ENCOUNTER — APPOINTMENT (OUTPATIENT)
Dept: CT IMAGING | Facility: HOSPITAL | Age: 36
End: 2021-04-09

## 2021-04-09 ENCOUNTER — HOSPITAL ENCOUNTER (EMERGENCY)
Facility: HOSPITAL | Age: 36
Discharge: HOME | End: 2021-04-09
Attending: FAMILY MEDICINE | Admitting: FAMILY MEDICINE

## 2021-04-09 VITALS
DIASTOLIC BLOOD PRESSURE: 75 MMHG | OXYGEN SATURATION: 98 % | TEMPERATURE: 98.2 F | RESPIRATION RATE: 19 BRPM | HEART RATE: 87 BPM | WEIGHT: 120 LBS | HEIGHT: 64 IN | BODY MASS INDEX: 20.49 KG/M2 | SYSTOLIC BLOOD PRESSURE: 133 MMHG

## 2021-04-09 DIAGNOSIS — S09.90XD CLOSED HEAD INJURY, SUBSEQUENT ENCOUNTER: Primary | ICD-10-CM

## 2021-04-09 DIAGNOSIS — S06.0X0D CONCUSSION WITHOUT LOSS OF CONSCIOUSNESS, SUBSEQUENT ENCOUNTER: ICD-10-CM

## 2021-04-09 PROCEDURE — 99282 EMERGENCY DEPT VISIT SF MDM: CPT

## 2021-04-09 PROCEDURE — 70450 CT HEAD/BRAIN W/O DYE: CPT

## 2021-04-09 RX ORDER — IBUPROFEN 400 MG/1
600 TABLET ORAL EVERY 6 HOURS PRN
Qty: 30 TABLET | Refills: 0 | Status: SHIPPED | OUTPATIENT
Start: 2021-04-09
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
## 2021-04-09 NOTE — DISCHARGE INSTRUCTIONS
Follow-up with your PCP on Monday.  Get lots of rest.  Return to ED should you develop unilateral weakness, altered sensation, confusion, or any other concerning symptoms.

## 2021-04-09 NOTE — ED PROVIDER NOTES
Refill requested for:  metoPROLOL tartrate (LOPRESSOR) 25 MG tablet      Last filled:  02/12/2020 #180 with 3 refills      Last office visit:  02/27/2020        Please advise on refill.       Patient still has current refills on pharmacy files.   Subjective   Patient presents with nausea, bilateral eye and ear pressure, inability to stay awake, and urinary incontinence after getting hit on top of the head with a lid from a  at work on Tuesday.  Patient states that a heavy lid hit her on the head Tuesday while she was at work.  She was dazed but did not lose consciousness.  She saw the doctor yesterday who diagnosed her with a mild concussion.  Patient states that she is feeling like her eyes are bulging and her ears have pressure on them.  She has not vomited.  She also is unable to control her bladder and has peed herself on the way to the bathroom.  She said yesterday evening, she sat up and she had a lot of drainage come out of her left ear which when she cleaned it with the tissue, it look like watery earwax.  She has not had this before.          Review of Systems   Constitutional: Positive for fatigue. Negative for activity change and appetite change.   HENT: Positive for ear discharge. Negative for congestion and ear pain.         Bilat ear pressure   Eyes: Negative for pain and discharge.        Bilat eye pressure   Respiratory: Negative for chest tightness and shortness of breath.    Cardiovascular: Negative for chest pain and palpitations.   Gastrointestinal: Positive for nausea. Negative for abdominal distention and abdominal pain.   Endocrine: Negative for cold intolerance and heat intolerance.   Genitourinary: Negative for difficulty urinating and dysuria.   Musculoskeletal: Negative for arthralgias and back pain.   Skin: Negative for color change and rash.   Allergic/Immunologic: Negative for environmental allergies and food allergies.   Neurological: Positive for headaches. Negative for dizziness.   Hematological: Negative for adenopathy. Does not bruise/bleed easily.   Psychiatric/Behavioral: Negative for agitation and confusion.       Past Medical History:   Diagnosis Date   • ADD (attention deficit disorder)    • Disease of thyroid  gland     hypothyroid       Allergies   Allergen Reactions   • Hydrocodone        Past Surgical History:   Procedure Laterality Date   • BICEPS TENDON REPAIR Right    • EAR CANALOPLASTY     • HYSTERECTOMY      partial   • LAPAROSCOPIC TUBAL LIGATION     • ROTATOR CUFF REPAIR Right    • TONSILLECTOMY         History reviewed. No pertinent family history.    Social History     Socioeconomic History   • Marital status:      Spouse name: Not on file   • Number of children: Not on file   • Years of education: Not on file   • Highest education level: Not on file   Tobacco Use   • Smoking status: Unknown If Ever Smoked   • Smokeless tobacco: Never Used   Substance and Sexual Activity   • Drug use: Never   • Sexual activity: Defer           Objective   Physical Exam  Vitals and nursing note reviewed.   Constitutional:       Appearance: She is well-developed.   HENT:      Head: Normocephalic and atraumatic.      Right Ear: Tympanic membrane normal.      Left Ear: Tympanic membrane normal.      Nose: Nose normal.      Mouth/Throat:      Mouth: Mucous membranes are moist.   Eyes:      Pupils: Pupils are equal, round, and reactive to light.   Neck:      Thyroid: No thyromegaly.      Vascular: No JVD.      Trachea: No tracheal deviation.   Cardiovascular:      Rate and Rhythm: Normal rate and regular rhythm.      Pulses:           Radial pulses are 2+ on the right side and 2+ on the left side.        Dorsalis pedis pulses are 2+ on the right side and 2+ on the left side.      Heart sounds: Normal heart sounds, S1 normal and S2 normal.   Pulmonary:      Effort: Pulmonary effort is normal.      Breath sounds: Normal breath sounds.   Abdominal:      General: Bowel sounds are normal.   Musculoskeletal:         General: Normal range of motion.      Cervical back: Tenderness (bilat stiffness) present.   Skin:     General: Skin is warm and dry.      Capillary Refill: Capillary refill takes 2 to 3 seconds.   Neurological:       General: No focal deficit present.      Mental Status: She is alert and oriented to person, place, and time.      GCS: GCS eye subscore is 4. GCS verbal subscore is 5. GCS motor subscore is 6.      Cranial Nerves: No cranial nerve deficit.      Sensory: No sensory deficit.      Motor: No weakness.      Gait: Gait normal.      Comments: NIH 0   Psychiatric:         Speech: Speech normal.         Behavior: Behavior normal.         Thought Content: Thought content normal.         Procedures         CT Head Without Contrast    Result Date: 4/9/2021  1.  No acute intracranial process. Electronically signed by:  Ebenezer Almanzar MD  4/9/2021 6:10 AM CDT Workstation: [id number]    ED Course                                           MDM  Number of Diagnoses or Management Options  Closed head injury, subsequent encounter: new and requires workup  Concussion without loss of consciousness, subsequent encounter: new and requires workup  Diagnosis management comments: CT head performed for questional unilateral ear drainage after injury and new urinary incontinence.  CT was negative.  Work note given till Monday.  Patient feels comfortable going home and resting.  Ibuprofen sent to pharmacy.       Amount and/or Complexity of Data Reviewed  Tests in the radiology section of CPT®: ordered and reviewed    Risk of Complications, Morbidity, and/or Mortality  Presenting problems: low  Diagnostic procedures: low  Management options: low    Patient Progress  Patient progress: stable      Final diagnoses:   Closed head injury, subsequent encounter   Concussion without loss of consciousness, subsequent encounter       ED Disposition  ED Disposition     ED Disposition Condition Comment    Discharge Stable           Mohit Calvillo MD  [address] [place] [address]  [phone]    Call in 1 day  for follow up         Medication List      New Prescriptions    ibuprofen 400 MG tablet  Commonly known as: ADVIL,MOTRIN  Take 1.5 tablets by mouth  Every 6 (Six) Hours As Needed for Mild Pain .           Where to Get Your Medications      These medications were sent to [facility] - [place], [place] - [address] [facility] - [phone]  - [phone]   [address] [facility] [address]    Phone: [phone]   · ibuprofen 400 MG tablet       This document has been electronically signed by Jude Sevilla MD on April 9, 2021 06:53 CDT    Jude Sevilla MD PGY-2  Part of this note may be an electronic transcription/translation of spoken language to printed text using the Dragon Dictation System.        Jude Sevilla MD  Resident  04/09/21 0601

## 2021-04-18 ENCOUNTER — HOSPITAL ENCOUNTER (EMERGENCY)
Facility: HOSPITAL | Age: 36
Discharge: LEFT BEFORE DISCHARGE | End: 2021-04-18
Attending: EMERGENCY MEDICINE | Admitting: EMERGENCY MEDICINE

## 2021-04-18 VITALS
TEMPERATURE: 98.6 F | DIASTOLIC BLOOD PRESSURE: 74 MMHG | SYSTOLIC BLOOD PRESSURE: 152 MMHG | HEART RATE: 90 BPM | RESPIRATION RATE: 18 BRPM | BODY MASS INDEX: 20.47 KG/M2 | HEIGHT: 64 IN | OXYGEN SATURATION: 99 % | WEIGHT: 119.93 LBS

## 2021-04-18 DIAGNOSIS — R11.0 NAUSEA: ICD-10-CM

## 2021-04-18 DIAGNOSIS — G44.201 ACUTE INTRACTABLE TENSION-TYPE HEADACHE: Primary | ICD-10-CM

## 2021-04-18 PROCEDURE — 99281 EMR DPT VST MAYX REQ PHY/QHP: CPT

## 2021-04-18 RX ORDER — METOCLOPRAMIDE HYDROCHLORIDE 5 MG/ML
10 INJECTION INTRAMUSCULAR; INTRAVENOUS ONCE
Status: DISCONTINUED | OUTPATIENT
Start: 2021-04-18 | End: 2021-04-18 | Stop reason: HOSPADM

## 2021-04-18 RX ORDER — DIPHENHYDRAMINE HYDROCHLORIDE 50 MG/ML
25 INJECTION INTRAMUSCULAR; INTRAVENOUS ONCE
Status: DISCONTINUED | OUTPATIENT
Start: 2021-04-18 | End: 2021-04-18 | Stop reason: HOSPADM

## 2021-04-18 RX ORDER — KETOROLAC TROMETHAMINE 30 MG/ML
30 INJECTION, SOLUTION INTRAMUSCULAR; INTRAVENOUS ONCE
Status: DISCONTINUED | OUTPATIENT
Start: 2021-04-18 | End: 2021-04-18 | Stop reason: HOSPADM

## 2021-04-18 NOTE — ED PROVIDER NOTES
Subjective   History of Present Illness  35 yr old female presents today with complains of worsening constant throbbing 10/10 headaches. Patient had a head injury at work and was seen in on 4/9/2021. CT head was unremarkable and she was sent home with Ibuprofen. Since then her headaches have been worsening and not responding to Ibuprofen. She also complains of nausea, blurred vision, ear drainage associated with it. She also had hit her head x 2 since then. She has been getting dizzy everytime she bends forward or changes position. Last night she hit her head on a table as she was trying to brace herself from a fall. She has been working 12-13 hrs a day which has worsened her headaches. She also complains of bilateral ear discharge and post nasal drip which has been irritating her throat and causing some hoarseness.       Review of Systems   Constitutional: Positive for activity change and fatigue. Negative for appetite change, chills, diaphoresis and fever.   HENT: Positive for ear discharge, postnasal drip, sore throat and voice change. Negative for congestion, ear pain, hearing loss, rhinorrhea, tinnitus and trouble swallowing.    Eyes: Positive for photophobia and visual disturbance.   Respiratory: Negative for chest tightness, shortness of breath and wheezing.    Cardiovascular: Negative for chest pain and palpitations.   Gastrointestinal: Positive for nausea and vomiting. Negative for abdominal pain.   Genitourinary: Negative for difficulty urinating, dysuria, frequency and urgency.   Musculoskeletal: Negative for arthralgias, myalgias, neck pain and neck stiffness.   Skin: Negative for color change.   Neurological: Positive for dizziness, light-headedness and headaches. Negative for tremors, syncope and weakness.   Psychiatric/Behavioral: Negative for behavioral problems, confusion and sleep disturbance.       Past Medical History:   Diagnosis Date   • ADD (attention deficit disorder)    • Disease of thyroid  gland     hypothyroid       Allergies   Allergen Reactions   • Hydrocodone        Past Surgical History:   Procedure Laterality Date   • BICEPS TENDON REPAIR Right    • EAR CANALOPLASTY     • HYSTERECTOMY      partial   • LAPAROSCOPIC TUBAL LIGATION     • ROTATOR CUFF REPAIR Right    • TONSILLECTOMY         History reviewed. No pertinent family history.    Social History     Socioeconomic History   • Marital status:      Spouse name: Not on file   • Number of children: Not on file   • Years of education: Not on file   • Highest education level: Not on file   Tobacco Use   • Smoking status: Unknown If Ever Smoked   • Smokeless tobacco: Never Used   Substance and Sexual Activity   • Alcohol use: Yes     Comment: occasionally   • Drug use: Never   • Sexual activity: Defer           Objective   Physical Exam  Vitals and nursing note reviewed.   Constitutional:       General: She is in acute distress (due to headache ).      Appearance: Normal appearance.   HENT:      Head: Normocephalic and atraumatic.      Right Ear: Tympanic membrane, ear canal and external ear normal.      Left Ear: Tympanic membrane, ear canal and external ear normal.      Nose: Nose normal.      Mouth/Throat:      Mouth: Mucous membranes are moist.      Pharynx: Oropharynx is clear.   Eyes:      Extraocular Movements: Extraocular movements intact.      Conjunctiva/sclera: Conjunctivae normal.      Pupils: Pupils are equal, round, and reactive to light.   Cardiovascular:      Rate and Rhythm: Normal rate and regular rhythm.      Pulses: Normal pulses.      Heart sounds: Normal heart sounds.   Pulmonary:      Effort: Pulmonary effort is normal. No respiratory distress.      Breath sounds: Normal breath sounds. No wheezing.   Abdominal:      General: Bowel sounds are normal.      Palpations: Abdomen is soft.      Tenderness: There is no abdominal tenderness.   Musculoskeletal:         General: Normal range of motion.      Cervical back: Normal  range of motion and neck supple.      Right lower leg: No edema.      Left lower leg: No edema.   Skin:     General: Skin is warm and dry.      Capillary Refill: Capillary refill takes less than 2 seconds.   Neurological:      General: No focal deficit present.      Mental Status: She is alert and oriented to person, place, and time.   Psychiatric:         Mood and Affect: Mood normal.         Behavior: Behavior normal.         Procedures           ED Course  ED Course as of Apr 18 1215   Sun Apr 18, 2021   1214 Patient eloped     [LN]      ED Course User Index  [LN] Oksana Kinsey MD                                           MDM  Number of Diagnoses or Management Options  Acute intractable tension-type headache  Nausea  Diagnosis management comments: Patient presented to ED with stable vitals. She was in some discomfort due to headache and photophobia. Patient eloped prior to giving any medications.       Final diagnoses:   Acute intractable tension-type headache   Nausea       ED Disposition  ED Disposition     ED Disposition Condition Comment    Eloped            Mohit Calvillo MD  [address] [facility] [address]  [phone]               Medication List      No changes were made to your prescriptions during this visit.          Oksana Kinsey MD  Resident  04/18/21 1215

## 2021-04-18 NOTE — ED NOTES
Pt arrives with complaints of sustaining a head injury at work X2 weeks ago.  Pt states she has kept a constant headache and now complains of drainage from bilateral ears.  Pts significant other states they believe patient is leaking CSF.  Provider at bedside for evaluation, pt is A&O X4, VSS, pain 10/10     Marycarmen Knott, RN  04/18/21 3605

## 2021-04-18 NOTE — ED NOTES
Pt not in room when this RN went into room to begin orders.      Marycarmen Knott, RN  04/18/21 6727